# Patient Record
Sex: MALE | Race: BLACK OR AFRICAN AMERICAN | NOT HISPANIC OR LATINO | Employment: FULL TIME | URBAN - METROPOLITAN AREA
[De-identification: names, ages, dates, MRNs, and addresses within clinical notes are randomized per-mention and may not be internally consistent; named-entity substitution may affect disease eponyms.]

---

## 2017-08-09 ENCOUNTER — OFFICE VISIT (OUTPATIENT)
Dept: CARDIOLOGY | Facility: MEDICAL CENTER | Age: 42
End: 2017-08-09
Payer: COMMERCIAL

## 2017-08-09 VITALS
OXYGEN SATURATION: 97 % | DIASTOLIC BLOOD PRESSURE: 82 MMHG | WEIGHT: 162.2 LBS | HEART RATE: 68 BPM | SYSTOLIC BLOOD PRESSURE: 132 MMHG

## 2017-08-09 DIAGNOSIS — R94.31 NONSPECIFIC ABNORMAL ELECTROCARDIOGRAM (ECG) (EKG): ICD-10-CM

## 2017-08-09 DIAGNOSIS — R00.2 PALPITATIONS: ICD-10-CM

## 2017-08-09 DIAGNOSIS — R01.1 SYSTOLIC MURMUR: ICD-10-CM

## 2017-08-09 LAB — EKG IMPRESSION: NORMAL

## 2017-08-09 PROCEDURE — 99204 OFFICE O/P NEW MOD 45 MIN: CPT | Performed by: INTERNAL MEDICINE

## 2017-08-09 PROCEDURE — 93000 ELECTROCARDIOGRAM COMPLETE: CPT | Performed by: INTERNAL MEDICINE

## 2017-08-09 RX ORDER — ASPIRIN 325 MG
325 TABLET ORAL EVERY 6 HOURS PRN
COMMUNITY

## 2017-08-09 ASSESSMENT — ENCOUNTER SYMPTOMS
LOSS OF CONSCIOUSNESS: 0
SHORTNESS OF BREATH: 0
ABDOMINAL PAIN: 0
DEPRESSION: 0
PND: 0
DIZZINESS: 0
BRUISES/BLEEDS EASILY: 0
FALLS: 0
HEADACHES: 1
PALPITATIONS: 1
ORTHOPNEA: 0

## 2017-08-09 NOTE — MR AVS SNAPSHOT
Tyesha Will   2017 8:20 AM   Office Visit   MRN: [id number]    Department:  [facility]   Dept Phone:  [phone]    Description:  Male : 1975   Provider:  Felicity Carson M.D.           Reason for Visit     New Patient           Allergies as of 2017     No Known Allergies      You were diagnosed with     Nonspecific abnormal electrocardiogram (ECG) (EKG)   [794.31.ICD-9-CM]       Systolic murmur   [921149]       Palpitations   [785.1.ICD-9-CM]         Vital Signs     Blood Pressure Pulse Weight Oxygen Saturation Smoking Status       132/82 mmHg 68 73.573 kg (162 lb 3.2 oz) 97% Never Smoker        Basic Information     Date Of Birth Sex Race Ethnicity Preferred Language    1975 Male Black or  Non- English      Your appointments     Aug 17, 2017  2:00 PM   ZIOPATCH with [facility]   [facility] (--)    [address], [address]  [place] [id number] [phone]            Aug 31, 2017  4:15 PM   ECHO with [facility] EXAM 9   [facility]ARD[facility]OLOGY [facility]    [address] [facility] [address] [facility] [address] [facility]  [place] [address]   [phone]            Sep 14, 2017  3:20 PM   FOLLOW UP with Felicity Carson M.D.   [facility] (--)    [address], [address]  [place] [address] [phone]              Health Maintenance     Patient has no pending health maintenance at this time      Results       Current Immunizations     Tuberculin Skin Test 2016, 2013      Below and/or attached are the medications your provider expects you to take. Review all of your home medications and newly ordered medications with your provider and/or pharmacist. Follow medication instructions as directed by your provider and/or pharmacist. Please keep your medication list with you and share with your provider. Update the information when medications are discontinued, doses are changed, or new medications (including over-the-counter  products) are added; and carry medication information at all times in the event of emergency situations     Allergies:  No Known Allergies          Medications  Valid as of: August 09, 2017 -  9:21 AM    Generic Name Brand Name Tablet Size Instructions for use    Aspirin (Tab)  MG Take 325 mg by mouth every 6 hours as needed.        .                 Medicines prescribed today were sent to:     [facility] [place] [facility] [address]    [address] [place] [address]    Phone: [phone] Fax: [phone]    Open 24 Hours?: No      Medication refill instructions:       If your prescription bottle indicates you have medication refills left, it is not necessary to call your provider’s office. Please contact your pharmacy and they will refill your medication.    If your prescription bottle indicates you do not have any refills left, you may request refills at any time through one of the following ways: The online [employer] system (except Urgent Care), by calling your provider’s office, or by asking your pharmacy to contact your provider’s office with a refill request. Medication refills are processed only during regular business hours and may not be available until the next business day. Your provider may request additional information or to have a follow-up visit with you prior to refilling your medication.   *Please Note: Medication refills are assigned a new Rx number when refilled electronically. Your pharmacy may indicate that no refills were authorized even though a new prescription for the same medication is available at the pharmacy. Please request the medicine by name with the pharmacy before contacting your provider for a refill.        Your To Do List     Future Labs/Procedures Complete By Expires    ECHOCARDIOGRAM COMP W/O CONT  As directed 8/9/2018    HOLTER MONITOR / EVENT RECORDER  As directed 8/9/2018         [employer] Access Code: [email or web address]  Expires: 9/8/2017  9:21  AM    [employer]  A secure, online tool to manage your health information     [employer]’s [employer]® is a secure, online tool that connects you to your personalized health information from the privacy of your home -- day or night - making it very easy for you to manage your healthcare. Once the activation process is completed, you can even access your medical information using the [employer] wild, which is available for free in the Apple Wild store or Google Play store.     [employer] provides the following levels of access (as shown below):   My Chart Features   [facility] Doctor [facility]  Specialists [facility]  Urgent  Care [facility]  Primary Care  Doctor   Email your healthcare team securely and privately 24/7 X X X    Manage appointments: schedule your next appointment; view details of past/upcoming appointments X      Request prescription refills. X      View recent personal medical records, including lab and immunizations X X X X   View health record, including health history, allergies, medications X X X X   Read reports about your outpatient visits, procedures, consult and ER notes X X X X   See your discharge summary, which is a recap of your hospital and/or ER visit that includes your diagnosis, lab results, and care plan. X X       How to register for [employer]:  1. Go to  [email or web address].  2. Click on the Sign Up Now box, which takes you to the New Member Sign Up page. You will need to provide the following information:  a. Enter your [employer] Access Code exactly as it appears at the top of this page. (You will not need to use this code after you’ve completed the sign-up process. If you do not sign up before the expiration date, you must request a new code.)   b. Enter your date of birth.   c. Enter your home email address.   d. Click Submit, and follow the next screen’s instructions.  3. Create a [employer] ID. This will be your [employer] login ID and cannot be changed, so think of one that is secure and  easy to remember.  4. Create a [employer] password. You can change your password at any time.  5. Enter your Password Reset Question and Answer. This can be used at a later time if you forget your password.   6. Enter your e-mail address. This allows you to receive e-mail notifications when new information is available in [employer].  7. Click Sign Up. You can now view your health information.    For assistance activating your [employer] account, call [phone]

## 2017-08-09 NOTE — Clinical Note
[facility]   [address], [address]  [place] [address]  Phone: [phone]  Fax: [phone]              Tyesha Will  1975    Encounter Date: 8/9/2017    Felicity Carson M.D.          PROGRESS NOTE:  Subjective:   Tyesha Will is a 41 y.o. male with a significant cardiac history who was referred to cardiology clinic for further management of palpitations. Patient reports that he was in his usual state of health till March 2017 at which time he said noticing palpitations. He reports palpitations that occur about once a week and spontaneously resolve in a few seconds. He denies any associated dizziness, dyspnea or chest discomfort. He works 2 jobs and is extremely busy. He notes that his palpitations usually occur on the last day of his work which is usually about Wednesday.    He also reports a history of uncontrolled hypertension with systolic blood pressure to the 160s a few months ago, however repeat checks since then have been completely normal.    He denies any caffeinated beverage intake on a regular basis. He occasionally drinks hot chocolate or tea. He has a severe history of migraines for which she takes aspirin 325 mg as needed. No history of GI/ bleeding.    Past Medical History   Diagnosis Date   • Migraine      History reviewed. No pertinent past surgical history.  Family History   Problem Relation Age of Onset   • Hypertension Mother      History   Smoking status   • Never Smoker    Smokeless tobacco   • Never Used    no alcohol or recreational drug use.  Patient immigrated from [facility] in [place].    No Known Allergies  Outpatient Encounter Prescriptions as of 8/9/2017   Medication Sig Dispense Refill   • aspirin (ASA) 325 MG Tab Take 325 mg by mouth every 6 hours as needed.       No facility-administered encounter medications on file as of 8/9/2017.     Review of Systems   Constitutional: Negative for malaise/fatigue.   HENT: Positive for nosebleeds.      Respiratory: Negative for shortness of breath.    Cardiovascular: Positive for palpitations. Negative for chest pain, orthopnea, leg swelling and PND.   Gastrointestinal: Negative for abdominal pain.   Musculoskeletal: Negative for falls.   Skin: Negative.    Neurological: Positive for headaches. Negative for dizziness and loss of consciousness.   Endo/Heme/Allergies: Does not bruise/bleed easily.   Psychiatric/Behavioral: Negative for depression.   All other systems reviewed and are negative.       Objective:   /82 mmHg  Pulse 68  Wt 73.573 kg (162 lb 3.2 oz)  SpO2 97%    Physical Exam   Constitutional: He is oriented to person, place, and time. No distress.   HENT:   Head: Normocephalic and atraumatic.   Eyes: Conjunctivae are normal.   Neck: Normal range of motion. Neck supple. No JVD present.   Cardiovascular: Normal rate and regular rhythm.  Exam reveals no gallop and no friction rub.    Murmur heard.   Systolic murmur is present with a grade of 2/6   Best appreciated in the left upper sternal border. Holosystolic.   Pulmonary/Chest: Effort normal and breath sounds normal. No respiratory distress. He has no wheezes. He has no rales.   Abdominal: Soft. There is no tenderness.   Musculoskeletal: He exhibits no edema.   Neurological: He is alert and oriented to person, place, and time.   Skin: Skin is warm and dry. He is not diaphoretic.   Psychiatric: He has a normal mood and affect.   Nursing note and vitals reviewed.      EKG in April 2017 was reviewed and showed normal sinus rhythm at 66 bpm, left axis deviation, left anterior fascicular block, poor R-wave progression.    EKG from today was reviewed and showed normal sinus rhythm at 65 bpm, otherwise no changes from baseline.    Assessment:     1. Nonspecific abnormal electrocardiogram (ECG) (EKG)  EKG   2. Systolic murmur  ECHOCARDIOGRAM COMP W/O CONT   3. Palpitations  HOLTER MONITOR / EVENT RECORDER       Medical Decision Making:  Today's  Assessment / Status / Plan:     Palpitations-occur about once a week without any clear triggers. Patient denies any caffeinated or alcoholic beverage intake. Possibly related to stress as patient only reports symptoms on the last day of his work. No associated symptoms. Given his infrequent symptoms, I will refer him for a two-week ZIOpatch monitor for further evaluation. If his insurance does not approve, we'll need to refer him for a 48 hour Holter but I have requested the patient to get that toward the end of his work week. For now no new medications.    Systolic murmur-patient on exam appears to have mitral regurgitation and possibly pulmonic valve involvement as well. I have referred him for an echocardiogram for further evaluation.     Elevated blood pressure-appears to only have been one reading. Blood pressure at goal today. No changes for now.    Return to clinic in 5 weeks or earlier if needed.    Thank you for allowing me to participate in the care of this patient. Please do not hesitate to contact me with any questions.    Felicity Carson MD  Cardiologist  [facility]    PLEASE NOTE: This dictation was created using voice recognition software.         Huong Goodrich A.P.R.N.  [address] [place] [address] [place] [address]  VIA Facsimile: [phone]

## 2017-08-09 NOTE — PROGRESS NOTES
Subjective:   Tyesha Will is a 41 y.o. male with a significant cardiac history who was referred to cardiology clinic for further management of palpitations. Patient reports that he was in his usual state of health till March 2017 at which time he said noticing palpitations. He reports palpitations that occur about once a week and spontaneously resolve in a few seconds. He denies any associated dizziness, dyspnea or chest discomfort. He works 2 jobs and is extremely busy. He notes that his palpitations usually occur on the last day of his work which is usually about Wednesday.    He also reports a history of uncontrolled hypertension with systolic blood pressure to the 160s a few months ago, however repeat checks since then have been completely normal.    He denies any caffeinated beverage intake on a regular basis. He occasionally drinks hot chocolate or tea. He has a severe history of migraines for which she takes aspirin 325 mg as needed. No history of GI/ bleeding.    Past Medical History   Diagnosis Date   • Migraine      History reviewed. No pertinent past surgical history.  Family History   Problem Relation Age of Onset   • Hypertension Mother      History   Smoking status   • Never Smoker    Smokeless tobacco   • Never Used    no alcohol or recreational drug use.  Patient immigrated from [facility] in [place].    No Known Allergies  Outpatient Encounter Prescriptions as of 8/9/2017   Medication Sig Dispense Refill   • aspirin (ASA) 325 MG Tab Take 325 mg by mouth every 6 hours as needed.       No facility-administered encounter medications on file as of 8/9/2017.     Review of Systems   Constitutional: Negative for malaise/fatigue.   HENT: Positive for nosebleeds.    Respiratory: Negative for shortness of breath.    Cardiovascular: Positive for palpitations. Negative for chest pain, orthopnea, leg swelling and PND.   Gastrointestinal: Negative for abdominal pain.   Musculoskeletal: Negative for falls.   Skin:  Negative.    Neurological: Positive for headaches. Negative for dizziness and loss of consciousness.   Endo/Heme/Allergies: Does not bruise/bleed easily.   Psychiatric/Behavioral: Negative for depression.   All other systems reviewed and are negative.       Objective:   /82 mmHg  Pulse 68  Wt 73.573 kg (162 lb 3.2 oz)  SpO2 97%    Physical Exam   Constitutional: He is oriented to person, place, and time. No distress.   HENT:   Head: Normocephalic and atraumatic.   Eyes: Conjunctivae are normal.   Neck: Normal range of motion. Neck supple. No JVD present.   Cardiovascular: Normal rate and regular rhythm.  Exam reveals no gallop and no friction rub.    Murmur heard.   Systolic murmur is present with a grade of 2/6   Best appreciated in the left upper sternal border. Holosystolic.   Pulmonary/Chest: Effort normal and breath sounds normal. No respiratory distress. He has no wheezes. He has no rales.   Abdominal: Soft. There is no tenderness.   Musculoskeletal: He exhibits no edema.   Neurological: He is alert and oriented to person, place, and time.   Skin: Skin is warm and dry. He is not diaphoretic.   Psychiatric: He has a normal mood and affect.   Nursing note and vitals reviewed.      EKG in April 2017 was reviewed and showed normal sinus rhythm at 66 bpm, left axis deviation, left anterior fascicular block, poor R-wave progression.    EKG from today was reviewed and showed normal sinus rhythm at 65 bpm, otherwise no changes from baseline.    Assessment:     1. Nonspecific abnormal electrocardiogram (ECG) (EKG)  EKG   2. Systolic murmur  ECHOCARDIOGRAM COMP W/O CONT   3. Palpitations  HOLTER MONITOR / EVENT RECORDER       Medical Decision Making:  Today's Assessment / Status / Plan:     Palpitations-occur about once a week without any clear triggers. Patient denies any caffeinated or alcoholic beverage intake. Possibly related to stress as patient only reports symptoms on the last day of his work. No  associated symptoms. Given his infrequent symptoms, I will refer him for a two-week ZIOpatch monitor for further evaluation. If his insurance does not approve, we'll need to refer him for a 48 hour Holter but I have requested the patient to get that toward the end of his work week. For now no new medications.    Systolic murmur-patient on exam appears to have mitral regurgitation and possibly pulmonic valve involvement as well. I have referred him for an echocardiogram for further evaluation.     Elevated blood pressure-appears to only have been one reading. Blood pressure at goal today. No changes for now.    Return to clinic in 5 weeks or earlier if needed.    Thank you for allowing me to participate in the care of this patient. Please do not hesitate to contact me with any questions.    Felicity Carson MD  Cardiologist  [facility]    PLEASE NOTE: This dictation was created using voice recognition software.

## 2017-09-11 ENCOUNTER — TELEPHONE (OUTPATIENT)
Dept: CARDIOLOGY | Facility: MEDICAL CENTER | Age: 42
End: 2017-09-11

## 2019-03-06 ENCOUNTER — NON-PROVIDER VISIT (OUTPATIENT)
Dept: OCCUPATIONAL MEDICINE | Facility: CLINIC | Age: 44
End: 2019-03-06

## 2019-03-06 DIAGNOSIS — Z02.1 PRE-EMPLOYMENT DRUG SCREENING: ICD-10-CM

## 2019-03-06 DIAGNOSIS — Z02.1 DRUG TESTING, PRE-EMPLOYMENT: ICD-10-CM

## 2019-03-06 LAB
AMP AMPHETAMINE: NORMAL
COC COCAINE: NORMAL
INT CON NEG: NORMAL
INT CON POS: NORMAL
MET METHAMPHETAMINES: NORMAL
OPI OPIATES: NORMAL
PCP PHENCYCLIDINE: NORMAL
POC DRUG COMMENT 753798-OCCUPATIONAL HEALTH: NEGATIVE
THC: NORMAL

## 2019-03-06 PROCEDURE — 80305 DRUG TEST PRSMV DIR OPT OBS: CPT | Performed by: INTERNAL MEDICINE
